# Patient Record
(demographics unavailable — no encounter records)

---

## 2025-01-15 NOTE — HISTORY OF PRESENT ILLNESS
[No Feeding Issues] : no feeding issues at this time [de-identified] : Lizbeth presented to [facility] in January 2021 at age 5 months old with one week of right eye swelling and one day of proptosis. She was evaluated by optho on the day of admission who noted sluggish pupil reflexes and referred her to the ER. 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 She underwent biopsy on January 6th with Dr. 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 She was enrolled on a crizotinib liquid expanded access program and began treatment on January 9th, however had difficulty tolerating the medication orally and had a NGT placed and received her first full doses on January 10th 2021.   She was monitored with imaging while on treatment and has had an excellent response with likely no residual disease. She has had issue with recurrent neutropenia due to treatment and had 2 dose reductions, and last dose level reduction to  -2.   Due to intermittent neutropenia, crizotinib was discontinued and her last dose was on 8/30/22.  Lizbeth began on treatment with Lorlatinib, a 3rd generation ALK inhibitor,  on 9/16/22. She completed her treatment on 10/16/23. [de-identified] : Lizbeth is here today for followup, off treatment since October 2023. Mom reports she's been doing great since last visit. She is in  now and loves it. Very social with other kids, speech must improved- continues with therapy three times a week. Has had several URIs since starting . Continues to use miralax, 1/2 cup per day- if they miss a few days she will get constipated, using toilet. Continues with glasses and following with optho.   MRI last week JESÚS

## 2025-01-15 NOTE — REASON FOR VISIT
[Follow-Up Visit] : a follow-up visit for [Father] : father [FreeTextEntry2] : Inflammatory Myofibroblastic Tumor, ALK+ , off treatment

## 2025-01-15 NOTE — CONSULT LETTER
[Dear  ___] : Dear  [unfilled], [Courtesy Letter:] : I had the pleasure of seeing your patient, [unfilled], in my office today. [Please see my note below.] : Please see my note below. [Consult Closing:] : Thank you very much for allowing me to participate in the care of this patient.  If you have any questions, please do not hesitate to contact me. [Sincerely,] : Sincerely, [FreeTextEntry2] : Dr Brennan Owens  [address] [place] [address] Tel. #: [phone] Fax #: [phone] [FreeTextEntry3] : Anayeli Zafar MD, MPH Head, [employer] Attending Physician, Childhood Solid Tumor Program  Pediatric Hematology-Oncology and Stem Cell Transplant [facility]

## 2025-01-15 NOTE — PHYSICAL EXAM
[Normal] : normal appearance, no rash, nodules, vesicles, ulcers, erythema [No focal deficits] : no focal deficits [PERRLA] : ROMIE [Normal gait] : normal gait  [100: Fully active, normal.] : 100: Fully active, normal. [de-identified] : left eye normal. slight pstosis of right eye, with healed incision on upper lid.  [de-identified] : gentle II/VI systolic ejection murmur, RRR  [de-identified] : eyes track well together.

## 2025-01-15 NOTE — PHYSICAL EXAM
[Normal] : normal appearance, no rash, nodules, vesicles, ulcers, erythema [No focal deficits] : no focal deficits [PERRLA] : ROMIE [Normal gait] : normal gait  [100: Fully active, normal.] : 100: Fully active, normal. [de-identified] : left eye normal. slight pstosis of right eye, with healed incision on upper lid.  [de-identified] : gentle II/VI systolic ejection murmur, RRR  [de-identified] : eyes track well together.

## 2025-01-15 NOTE — HISTORY OF PRESENT ILLNESS
[No Feeding Issues] : no feeding issues at this time [de-identified] : Lizbeth presented to [facility] in January 2021 at age 5 months old with one week of right eye swelling and one day of proptosis. She was evaluated by optho on the day of admission who noted sluggish pupil reflexes and referred her to the ER. 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 She underwent biopsy on January 6th with Dr. 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 She was enrolled on a crizotinib liquid expanded access program and began treatment on January 9th, however had difficulty tolerating the medication orally and had a NGT placed and received her first full doses on January 10th 2021.   She was monitored with imaging while on treatment and has had an excellent response with likely no residual disease. She has had issue with recurrent neutropenia due to treatment and had 2 dose reductions, and last dose level reduction to  -2.   Due to intermittent neutropenia, crizotinib was discontinued and her last dose was on 8/30/22.  Lizbeth began on treatment with Lorlatinib, a 3rd generation ALK inhibitor,  on 9/16/22. She completed her treatment on 10/16/23. [de-identified] : Lizbeth is here today for followup, off treatment since October 2023. Mom reports she's been doing great since last visit. She is in  now and loves it. Very social with other kids, speech must improved- continues with therapy three times a week. Has had several URIs since starting . Continues to use miralax, 1/2 cup per day- if they miss a few days she will get constipated, using toilet. Continues with glasses and following with optho.   MRI last week JESÚS

## 2025-01-15 NOTE — SOCIAL HISTORY
[Mother] : mother [Father] : father [Brother] : brother [Sister] : sister [FreeTextEntry2] : staying home with Lizbeth and sister [FreeTextEntry3] :

## 2025-01-15 NOTE — REVIEW OF SYSTEMS
[Negative] : Allergic/Immunologic [Immunizations are up to date by report] : Immunizations are up to date by report [FreeTextEntry3] : see history  [de-identified] : speech delay, much improved

## 2025-01-15 NOTE — REVIEW OF SYSTEMS
[Negative] : Allergic/Immunologic [Immunizations are up to date by report] : Immunizations are up to date by report [FreeTextEntry3] : see history  [de-identified] : speech delay, much improved

## 2025-01-15 NOTE — CONSULT LETTER
[Dear  ___] : Dear  [unfilled], [Courtesy Letter:] : I had the pleasure of seeing your patient, [unfilled], in my office today. [Please see my note below.] : Please see my note below. [Consult Closing:] : Thank you very much for allowing me to participate in the care of this patient.  If you have any questions, please do not hesitate to contact me. [Sincerely,] : Sincerely, [FreeTextEntry2] : Dr Brennan Owens  [address] [place] [address] Tel. #: [phone] Fax #: [phone] [FreeTextEntry3] : Anayeli Zafar MD, MPH Head, Development[employer] Attending Physician, Childhood Solid Tumor Program  Pediatric Hematology-Oncology and Stem Cell Transplant [facility]

## 2025-05-02 NOTE — PHYSICAL EXAM
[No focal deficits] : no focal deficits [PERRLA] : ROMIE [Normal gait] : normal gait  [de-identified] : gentle II/VI systolic ejection murmur, RRR  [100: Fully active, normal.] : 100: Fully active, normal. [Normal] : No murmurs, rubs, gallops [de-identified] : left eye normal. slight pstosis of right eye, with healed incision on upper lid.  [de-identified] : no rash  [de-identified] : eyes track well together.

## 2025-05-02 NOTE — CONSULT LETTER
[Dear  ___] : Dear  [unfilled], [Courtesy Letter:] : I had the pleasure of seeing your patient, [unfilled], in my office today. [Please see my note below.] : Please see my note below. [Consult Closing:] : Thank you very much for allowing me to participate in the care of this patient.  If you have any questions, please do not hesitate to contact me. [Sincerely,] : Sincerely, [FreeTextEntry2] : Dr Brennan Owens  [address] [place] [address] Tel. #: [phone] Fax #: [phone] [FreeTextEntry3] : Anayeli Zafar MD, MPH Head, Developmental [employer] Attending Physician, Childhood Solid Tumor Program  Pediatric Hematology-Oncology and Stem Cell Transplant [facility]

## 2025-05-02 NOTE — REVIEW OF SYSTEMS
[Negative] : Allergic/Immunologic [Immunizations are up to date by report] : Immunizations are up to date by report [FreeTextEntry3] : see history  [de-identified] : speech issues resolved

## 2025-05-02 NOTE — SOCIAL HISTORY
to return home [Mother] : mother [Father] : father [Brother] : brother [Sister] : sister [FreeTextEntry2] : staying home with Lizbeth and sister [FreeTextEntry3] :

## 2025-05-02 NOTE — PHYSICAL EXAM
[No focal deficits] : no focal deficits [PERRLA] : ROMIE [Normal gait] : normal gait  [de-identified] : gentle II/VI systolic ejection murmur, RRR  [100: Fully active, normal.] : 100: Fully active, normal. [Normal] : No murmurs, rubs, gallops [de-identified] : left eye normal. slight pstosis of right eye, with healed incision on upper lid.  [de-identified] : no rash  [de-identified] : eyes track well together.

## 2025-05-02 NOTE — HISTORY OF PRESENT ILLNESS
[No Feeding Issues] : no feeding issues at this time [de-identified] : Lizbeth presented to [facility] in January 2021 at age 5 months old with one week of right eye swelling and one day of proptosis. She was evaluated by optho on the day of admission who noted sluggish pupil reflexes and referred her to the ER. 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 She underwent biopsy on January 6th with Dr. 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 She was enrolled on a crizotinib liquid expanded access program and began treatment on January 9th, however had difficulty tolerating the medication orally and had a NGT placed and received her first full doses on January 10th 2021.   She was monitored with imaging while on treatment and has had an excellent response with likely no residual disease. She has had issue with recurrent neutropenia due to treatment and had 2 dose reductions, and last dose level reduction to  -2.   Due to intermittent neutropenia, crizotinib was discontinued and her last dose was on 8/30/22.  Lizbeth began on treatment with Lorlatinib, a 3rd generation ALK inhibitor,  on 9/16/22. She completed her treatment on 10/16/23. [de-identified] : Lizbeth is here today for MRI, followup, and labs, off treatment since October 2023. Lizbeth reports she's been doing well since her last visit, no new concerns. Continues in  and with speech- much much improved and will be stopping in July. Had vaginal yeast infection last week, used cream and resolved.  Last optho visit in Feb- everything stable at that point.

## 2025-05-02 NOTE — REVIEW OF SYSTEMS
[Negative] : Allergic/Immunologic [Immunizations are up to date by report] : Immunizations are up to date by report [FreeTextEntry3] : see history  [de-identified] : speech issues resolved

## 2025-05-02 NOTE — CONSULT LETTER
[Dear  ___] : Dear  [unfilled], [Courtesy Letter:] : I had the pleasure of seeing your patient, [unfilled], in my office today. [Please see my note below.] : Please see my note below. [Consult Closing:] : Thank you very much for allowing me to participate in the care of this patient.  If you have any questions, please do not hesitate to contact me. [Sincerely,] : Sincerely, [FreeTextEntry2] : Dr Brennan Owens  [address] [place] [address] Tel. #: [phone] Fax #: [phone] [FreeTextEntry3] : Anayeli Zafar MD, MPH Head, Development[employer] Attending Physician, Childhood Solid Tumor Program  Pediatric Hematology-Oncology and Stem Cell Transplant [facility]

## 2025-05-02 NOTE — HISTORY OF PRESENT ILLNESS
[No Feeding Issues] : no feeding issues at this time [de-identified] : Lizbeth presented to [facility] [place] in January 2021 at age 5 months old with one week of right eye swelling and one day of proptosis. She was evaluated by optho on the day of admission who noted sluggish pupil reflexes and referred her to the ER. 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 She underwent biopsy on January 6th with Dr. 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 She was enrolled on a crizotinib liquid expanded access program and began treatment on January 9th, however had difficulty tolerating the medication orally and had a NGT placed and received her first full doses on January 10th 2021.   She was monitored with imaging while on treatment and has had an excellent response with likely no residual disease. She has had issue with recurrent neutropenia due to treatment and had 2 dose reductions, and last dose level reduction to  -2.   Due to intermittent neutropenia, crizotinib was discontinued and her last dose was on 8/30/22.  Lizbeth began on treatment with Lorlatinib, a 3rd generation ALK inhibitor,  on 9/16/22. She completed her treatment on 10/16/23. [de-identified] : Lizbeth is here today for MRI, followup, and labs, off treatment since October 2023. Lizbeth reports she's been doing well since her last visit, no new concerns. Continues in  and with speech- much much improved and will be stopping in July. Had vaginal yeast infection last week, used cream and resolved.  Last optho visit in Feb- everything stable at that point.